# Patient Record
Sex: MALE | Race: WHITE | NOT HISPANIC OR LATINO | URBAN - METROPOLITAN AREA
[De-identification: names, ages, dates, MRNs, and addresses within clinical notes are randomized per-mention and may not be internally consistent; named-entity substitution may affect disease eponyms.]

---

## 2023-04-07 ENCOUNTER — OFFICE VISIT (OUTPATIENT)
Dept: URGENT CARE | Facility: CLINIC | Age: 53
End: 2023-04-07
Payer: COMMERCIAL

## 2023-04-07 VITALS
HEIGHT: 68 IN | OXYGEN SATURATION: 99 % | HEART RATE: 68 BPM | RESPIRATION RATE: 18 BRPM | DIASTOLIC BLOOD PRESSURE: 84 MMHG | SYSTOLIC BLOOD PRESSURE: 142 MMHG | TEMPERATURE: 99 F | BODY MASS INDEX: 34.1 KG/M2 | WEIGHT: 225 LBS

## 2023-04-07 DIAGNOSIS — R10.30 LOWER ABDOMINAL PAIN: ICD-10-CM

## 2023-04-07 DIAGNOSIS — K52.9 GASTROENTERITIS: Primary | ICD-10-CM

## 2023-04-07 DIAGNOSIS — R11.2 NAUSEA VOMITING AND DIARRHEA: ICD-10-CM

## 2023-04-07 DIAGNOSIS — R19.7 NAUSEA VOMITING AND DIARRHEA: ICD-10-CM

## 2023-04-07 PROCEDURE — 99203 PR OFFICE/OUTPT VISIT, NEW, LEVL III, 30-44 MIN: ICD-10-PCS | Mod: S$GLB,,, | Performed by: PHYSICIAN ASSISTANT

## 2023-04-07 PROCEDURE — 99203 OFFICE O/P NEW LOW 30 MIN: CPT | Mod: S$GLB,,, | Performed by: PHYSICIAN ASSISTANT

## 2023-04-07 RX ORDER — CYCLOBENZAPRINE HCL 10 MG
10 TABLET ORAL NIGHTLY PRN
COMMUNITY
Start: 2023-01-05

## 2023-04-07 RX ORDER — LIDOCAINE HYDROCHLORIDE 20 MG/ML
10 SOLUTION OROPHARYNGEAL
Status: COMPLETED | OUTPATIENT
Start: 2023-04-07 | End: 2023-04-07

## 2023-04-07 RX ORDER — SERTRALINE HYDROCHLORIDE 50 MG/1
TABLET, FILM COATED ORAL
COMMUNITY
Start: 2023-04-06

## 2023-04-07 RX ORDER — FENOFIBRATE 120 MG/1
TABLET ORAL
COMMUNITY

## 2023-04-07 RX ORDER — METOPROLOL SUCCINATE 100 MG/1
TABLET, EXTENDED RELEASE ORAL
COMMUNITY

## 2023-04-07 RX ORDER — TADALAFIL 10 MG/1
TABLET ORAL
COMMUNITY

## 2023-04-07 RX ORDER — DICYCLOMINE HYDROCHLORIDE 10 MG/1
10 CAPSULE ORAL 2 TIMES DAILY PRN
Qty: 15 CAPSULE | Refills: 0 | Status: SHIPPED | OUTPATIENT
Start: 2023-04-07

## 2023-04-07 RX ORDER — MAG HYDROX/ALUMINUM HYD/SIMETH 200-200-20
30 SUSPENSION, ORAL (FINAL DOSE FORM) ORAL
Status: COMPLETED | OUTPATIENT
Start: 2023-04-07 | End: 2023-04-07

## 2023-04-07 RX ORDER — GABAPENTIN 300 MG/1
300 CAPSULE ORAL NIGHTLY
COMMUNITY
Start: 2023-03-21

## 2023-04-07 RX ORDER — DICYCLOMINE HYDROCHLORIDE 10 MG/5ML
20 SOLUTION ORAL
Status: COMPLETED | OUTPATIENT
Start: 2023-04-07 | End: 2023-04-07

## 2023-04-07 RX ORDER — ONDANSETRON 4 MG/1
4 TABLET, ORALLY DISINTEGRATING ORAL EVERY 8 HOURS PRN
Qty: 12 TABLET | Refills: 0 | Status: SHIPPED | OUTPATIENT
Start: 2023-04-07

## 2023-04-07 RX ORDER — MELATONIN 3 MG
LOZENGE ORAL
COMMUNITY

## 2023-04-07 RX ORDER — DICLOFENAC SODIUM 75 MG/1
75 TABLET, DELAYED RELEASE ORAL 2 TIMES DAILY
COMMUNITY
Start: 2023-04-01

## 2023-04-07 RX ORDER — LURASIDONE HYDROCHLORIDE 80 MG/1
TABLET, FILM COATED ORAL
COMMUNITY

## 2023-04-07 RX ADMIN — DICYCLOMINE HYDROCHLORIDE 20 MG: 10 SOLUTION ORAL at 05:04

## 2023-04-07 RX ADMIN — LIDOCAINE HYDROCHLORIDE 10 ML: 20 SOLUTION OROPHARYNGEAL at 05:04

## 2023-04-07 RX ADMIN — Medication 30 ML: at 05:04

## 2023-04-07 NOTE — PATIENT INSTRUCTIONS
PLEASE READ YOUR DISCHARGE INSTRUCTIONS ENTIRELY AS IT CONTAINS IMPORTANT INFORMATION.    -Take the zofran for nausea (it dissolves under your tongue)   -Take the bentyl for stomach cramping (may cause drowsiness).     -Use gatorade/pedialyte or rehydration packets to help stay hydrated. Vitamin water and plain water do not contain rehydrating electrolytes.  -Increase clear liquids (water, gatorade, pedialyte, broths, jello, etc) Hold off on solids for 12-18 hours. Then advance to BRAT diet (banana, rice, applesauce, tea, toast/crackers), then advance further as tolerated with plain boiled chicken. Avoid spicy or fatty foods.     -Use Peptobismol or Immodium to help alleviate your diarrhea symptoms.  Take 1 dose every 6-8 hours and monitor to see if you can repeat AS IT WILL CAUSE CONSTIPATION.    -Avoid imodium unless you have more than 6-8 loose stools in 24 hours.       -Take mylanta or simethicone for bloating or gas pain.     -Take pepcid 20mg or omeprazole 20mg  if you have heartburn or reflux sensation.      -Wash hands frequently while sick. Avoid ibuprofen or other NSAIDS until you are well.     -Please go to the ER if you experience worsening abdominal pain, blood in your vomit or stool, high fever, dizziness, fainting, swelling of your abdomen, inability to pass gas or stool, or inability to urinate.       -Please return or see your primary care doctor if you develop new or worsening symptoms in next 2-5 days.  Strict clinic versus ER precautions given.    Please arrange follow up with your primary medical clinic as soon as possible. You must understand that you've received an Urgent Care treatment only and that you may be released before all of your medical problems are known or treated. You, the patient, will arrange for follow up as instructed. If your symptoms worsen or fail to improve you should go to the Emergency Room.    WE CANNOT RULE OUT ALL POSSIBLE CAUSES OF YOUR SYMPTOMS IN THE URGENT CARE  SETTING PLEASE GO TO THE ER IF YOU FEELS YOUR CONDITION IS WORSENING OR YOU WOULD LIKE EMERGENT EVALUATION.          Call 911  Call 911 if any of these occur:  Trouble breathing  Confused  Very drowsy or trouble awakening  Fainting or loss of consciousness  Rapid heart rate  Chest pain  Seizure  Stiff neck  When to seek medical advice  Call your healthcare provider right away if any of these occur:  Increasing abdominal pain or constant lower right abdominal pain  Continued vomiting (unable to keep liquids down)  Diarrhea for more than 2 days in adults and 24 hours in children  Stools containing blood or pus or black tarry stools  Dark urine, reduced urine output  Weakness, dizziness  Drowsiness  Fever of 100.4°F (38.0°C), oral, or higher; or not better with fever medicine  New rash  If you are experiencing muscle weakness or arthritis symptoms during or after your gastroenteritis is gone

## 2023-04-07 NOTE — PROGRESS NOTES
"Subjective:      Patient ID: Michael Espinoza is a 52 y.o. male.    Vitals:  height is 5' 8" (1.727 m) and weight is 102.1 kg (225 lb). His temperature is 98.9 °F (37.2 °C). His blood pressure is 142/84 (abnormal) and his pulse is 68. His respiration is 18 and oxygen saturation is 99%.     Chief Complaint: Diarrhea    52-year-old male with a history of hyperlipidemia, anxiety, depression, and other comorbidities who presents to urgent care clinic for evaluation.  He is here traveling from [place] for vacation.  Been here since Sunday.  Only had 2 alcoholic drinks, Symptoms started 2 days after arrived here.  Started Tuesday night with lower abdominal cramping prior to BM, 1 episode of vomiting and 3 episodes of diarrhea.  Tolerating 8 following the next few mornings but symptoms are mostly at night with 1-2 episodes of vomiting and 3-4 episodes of nonbloody diarrhea.  Stopped vomiting yesterday.  Has been taking Imodium as directed on the bottle with no significant improvements.  Able to tolerate food and drinks but has intermittent belching, burping, heartburn, and abdominal bloating.  Has been eating food he normally does not eat.  Today tolerate toast, yogurt, and root beer float with no issues.    No fever, chills, urinary symptoms, weakness, URI symptoms, flank pain, chest pain, shortness a breath, international travel, trauma, bloody stool, dark stool, bloody emesis, recent hospitalization, or recent antibiotic use.    Medical assistant note:  Patient reports diarrhea and vomiting for the last three days. Patient reports diarrhea 3-6 times per day. Patient reports 2/10 mild abdominal pain. Patient has tried taking imodium with no relief.     Diarrhea   This is a new problem. The current episode started in the past 7 days. The problem has been waxing and waning. The stool consistency is described as Watery. The patient states that diarrhea awakens him from sleep. Associated symptoms include abdominal pain, " bloating and vomiting. Pertinent negatives include no arthralgias, chills, coughing, fever, headaches, increased  flatus, myalgias, sweats, URI or weight loss. Nothing aggravates the symptoms. There are no known risk factors. He has tried anti-motility drug for the symptoms. The treatment provided mild relief.     Constitution: Positive for appetite change. Negative for activity change, chills, sweating, fatigue, fever and generalized weakness.   HENT:  Negative for ear pain, hearing loss, facial swelling, congestion, postnasal drip, sinus pain, sinus pressure, sore throat, trouble swallowing and voice change.    Neck: Negative for neck pain, neck stiffness and painful lymph nodes.   Cardiovascular:  Negative for chest pain, leg swelling, palpitations, sob on exertion and passing out.   Eyes:  Negative for eye discharge, eye pain, eye redness, photophobia, vision loss, double vision, blurred vision and eyelid swelling.   Respiratory:  Negative for chest tightness, cough, sputum production, COPD, shortness of breath, wheezing and asthma.    Gastrointestinal:  Positive for abdominal pain, abdominal bloating, nausea, vomiting and diarrhea. Negative for constipation, bright red blood in stool, dark colored stools, rectal bleeding, rectal pain, hemorrhoids, heartburn and bowel incontinence.   Genitourinary:  Negative for dysuria, frequency, urgency, urine decreased, flank pain, bladder incontinence, hematuria and history of kidney stones.   Musculoskeletal:  Negative for trauma, joint pain, joint swelling, abnormal ROM of joint, muscle cramps and muscle ache.   Skin:  Negative for color change, pale, rash and wound.   Allergic/Immunologic: Negative for seasonal allergies, asthma and immunocompromised state.   Neurological:  Negative for dizziness, history of vertigo, light-headedness, passing out, facial drooping, speech difficulty, coordination disturbances, loss of balance, headaches, disorientation, altered mental  status, loss of consciousness, numbness, tingling and seizures.   Hematologic/Lymphatic: Negative for swollen lymph nodes, easy bruising/bleeding and trouble clotting. Does not bruise/bleed easily.   Psychiatric/Behavioral:  Negative for altered mental status and disorientation.       History reviewed. No pertinent past medical history.    Objective:     Physical Exam   Constitutional: He is oriented to person, place, and time. He appears well-developed. He is cooperative.  Non-toxic appearance. He does not appear ill. No distress.      Comments:Well appearing     HENT:   Head: Normocephalic and atraumatic.   Ears:   Right Ear: Hearing, external ear and ear canal normal. No no drainage, swelling or tenderness.   Left Ear: Hearing, external ear and ear canal normal. No no drainage, swelling or tenderness.   Nose: Nose normal. No rhinorrhea or purulent discharge. Right sinus exhibits no maxillary sinus tenderness and no frontal sinus tenderness. Left sinus exhibits no maxillary sinus tenderness and no frontal sinus tenderness.   Mouth/Throat: Uvula is midline, oropharynx is clear and moist and mucous membranes are normal. No oral lesions. No trismus in the jaw. No uvula swelling. No oropharyngeal exudate, posterior oropharyngeal edema or posterior oropharyngeal erythema. No tonsillar exudate.   Eyes: Conjunctivae, EOM and lids are normal. Pupils are equal, round, and reactive to light. No visual field deficit is present. Right eye exhibits no discharge. Left eye exhibits no discharge. Right conjunctiva is not injected. Right conjunctiva has no hemorrhage. Left conjunctiva is not injected. Left conjunctiva has no hemorrhage. Extraocular movement intact vision grossly intact gaze aligned appropriately   Neck: Neck supple. No neck rigidity present.   Cardiovascular: Normal rate, regular rhythm, normal heart sounds and normal pulses.   No murmur heard.  Pulmonary/Chest: Effort normal and breath sounds normal. No accessory  muscle usage or stridor. No respiratory distress. He has no wheezes. He exhibits no tenderness.   Abdominal: Normal appearance. He exhibits no distension. Soft. There is no abdominal tenderness. There is no rebound, no guarding, no left CVA tenderness and no right CVA tenderness.   Musculoskeletal: Normal range of motion.         General: Normal range of motion.      Right lower leg: No edema.      Left lower leg: No edema.      Comments: Moves all extremities with normal tone, strength, and ROM.  Gait normal.   Lymphadenopathy:     He has no cervical adenopathy.   Neurological: no focal deficit. He is alert, oriented to person, place, and time and at baseline. He has normal motor skills and normal sensation. He displays no weakness, facial symmetry, normal reflexes and no dysarthria. No cranial nerve deficit or sensory deficit. He exhibits normal muscle tone. He displays no seizure activity. Gait and coordination normal. Coordination normal. GCS eye subscore is 4. GCS verbal subscore is 5. GCS motor subscore is 6.   Skin: Skin is warm, dry, not diaphoretic and no rash. Capillary refill takes less than 2 seconds.   Psychiatric: His speech is normal and behavior is normal. Thought content normal.   Nursing note and vitals reviewed.    Assessment:     1. Gastroenteritis    2. Lower abdominal pain    3. Nausea vomiting and diarrhea      Patient presents with clinical exam findings and history consistent with gastritis/gastroenteritis.  On exam, patient is nontoxic appearing and vitals are stable.  Patient is essentially neurovascularly intact on exam.  No concern for sepsis and no acute abdomen on exam.        Patient significant improvements with GI cocktail in clinic. Patient was prescribed medications and recommended OTC treatments for their symptoms.  Increase fluid and fiber intake.  If symptoms do not improve/worsens, patient was referred back to PCP for continued outpatient workup and management.     Patient was  instructed to return for re-evaluation for any worsening or change in current symptoms. Strict ED versus clinic precautions given in depth. Discharge and follow-up instructions given verbally/printed with the patient who expressed understanding and willingness to comply with my recommendations.  Patient verbalized understanding and agreed with the entirety of plan of care.    Note dictated with voice recognition software, please excuse any grammatical errors.      Plan:       Gastroenteritis  -     aluminum-magnesium hydroxide-simethicone 200-200-20 mg/5 mL suspension 30 mL  -     dicyclomine 10 mg/5 mL syrup 20 mg  -     LIDOcaine HCl 2% oral solution 10 mL  -     ondansetron (ZOFRAN-ODT) 4 MG TbDL; Take 1 tablet (4 mg total) by mouth every 8 (eight) hours as needed (severe nausea and vomiting).  Dispense: 12 tablet; Refill: 0  -     dicyclomine (BENTYL) 10 MG capsule; Take 1 capsule (10 mg total) by mouth 2 (two) times daily as needed (stomach cramping).  Dispense: 15 capsule; Refill: 0    Lower abdominal pain  -     dicyclomine (BENTYL) 10 MG capsule; Take 1 capsule (10 mg total) by mouth 2 (two) times daily as needed (stomach cramping).  Dispense: 15 capsule; Refill: 0    Nausea vomiting and diarrhea  -     ondansetron (ZOFRAN-ODT) 4 MG TbDL; Take 1 tablet (4 mg total) by mouth every 8 (eight) hours as needed (severe nausea and vomiting).  Dispense: 12 tablet; Refill: 0             Additional MDM:     Heart Failure Score:   COPD = No    Patient Instructions   PLEASE READ YOUR DISCHARGE INSTRUCTIONS ENTIRELY AS IT CONTAINS IMPORTANT INFORMATION.    -Take the zofran for nausea (it dissolves under your tongue)   -Take the bentyl for stomach cramping (may cause drowsiness).     -Use gatorade/pedialyte or rehydration packets to help stay hydrated. Vitamin water and plain water do not contain rehydrating electrolytes.  -Increase clear liquids (water, gatorade, pedialyte, broths, jello, etc) Hold off on solids for  12-18 hours. Then advance to BRAT diet (banana, rice, applesauce, tea, toast/crackers), then advance further as tolerated with plain boiled chicken. Avoid spicy or fatty foods.     -Use Peptobismol or Immodium to help alleviate your diarrhea symptoms.  Take 1 dose every 6-8 hours and monitor to see if you can repeat AS IT WILL CAUSE CONSTIPATION.    -Avoid imodium unless you have more than 6-8 loose stools in 24 hours.       -Take mylanta or simethicone for bloating or gas pain.     -Take pepcid 20mg or omeprazole 20mg  if you have heartburn or reflux sensation.      -Wash hands frequently while sick. Avoid ibuprofen or other NSAIDS until you are well.     -Please go to the ER if you experience worsening abdominal pain, blood in your vomit or stool, high fever, dizziness, fainting, swelling of your abdomen, inability to pass gas or stool, or inability to urinate.       -Please return or see your primary care doctor if you develop new or worsening symptoms in next 2-5 days.  Strict clinic versus ER precautions given.    Please arrange follow up with your primary medical clinic as soon as possible. You must understand that you've received an Urgent Care treatment only and that you may be released before all of your medical problems are known or treated. You, the patient, will arrange for follow up as instructed. If your symptoms worsen or fail to improve you should go to the Emergency Room.    WE CANNOT RULE OUT ALL POSSIBLE CAUSES OF YOUR SYMPTOMS IN THE URGENT CARE SETTING PLEASE GO TO THE ER IF YOU FEELS YOUR CONDITION IS WORSENING OR YOU WOULD LIKE EMERGENT EVALUATION.          Call 911  Call 911 if any of these occur:  Trouble breathing  Confused  Very drowsy or trouble awakening  Fainting or loss of consciousness  Rapid heart rate  Chest pain  Seizure  Stiff neck  When to seek medical advice  Call your healthcare provider right away if any of these occur:  Increasing abdominal pain or constant lower right  abdominal pain  Continued vomiting (unable to keep liquids down)  Diarrhea for more than 2 days in adults and 24 hours in children  Stools containing blood or pus or black tarry stools  Dark urine, reduced urine output  Weakness, dizziness  Drowsiness  Fever of 100.4°F (38.0°C), oral, or higher; or not better with fever medicine  New rash  If you are experiencing muscle weakness or arthritis symptoms during or after your gastroenteritis is gone